# Patient Record
Sex: FEMALE | Race: WHITE | Employment: STUDENT
[De-identification: names, ages, dates, MRNs, and addresses within clinical notes are randomized per-mention and may not be internally consistent; named-entity substitution may affect disease eponyms.]

---

## 2017-09-06 DIAGNOSIS — N92.6 IRREGULAR PERIODS: ICD-10-CM

## 2017-09-06 RX ORDER — ETONOGESTREL AND ETHINYL ESTRADIOL VAGINAL RING .015; .12 MG/D; MG/D
RING VAGINAL
Qty: 3 EACH | Refills: 0 | Status: SHIPPED | OUTPATIENT
Start: 2017-09-06 | End: 2017-11-29

## 2017-09-06 NOTE — TELEPHONE ENCOUNTER
8/8/16 last office visit  Future appointment currently not scheduled.    BP Readings from Last 2 Encounters:   08/18/16 93/61   08/09/15 98/70     Annual visit is due.  Will give brenda period x1 per refill policy protocol.  Patient to schedule appointment for additional refills.    Michelle Black   [facility]  RN

## 2017-11-29 DIAGNOSIS — N92.6 IRREGULAR PERIODS: ICD-10-CM

## 2017-11-29 RX ORDER — ETONOGESTREL AND ETHINYL ESTRADIOL VAGINAL RING .015; .12 MG/D; MG/D
RING VAGINAL
Qty: 3 EACH | Refills: 3 | Status: SHIPPED | OUTPATIENT
Start: 2017-11-29 | End: 2018-05-03

## 2017-11-29 NOTE — TELEPHONE ENCOUNTER
8/18/16 last office visit for annual.  Future appointment currently not scheduled.    Routing refill request to provider for review/approval because:  Jennifer given x1 and patient did not follow up, please advise    Thank you.    Michelle Black   [facility]  RN

## 2018-03-17 ENCOUNTER — HEALTH MAINTENANCE LETTER (OUTPATIENT)
Age: 23
End: 2018-03-17

## 2018-05-03 DIAGNOSIS — N92.6 IRREGULAR PERIODS: ICD-10-CM

## 2018-05-03 RX ORDER — ETONOGESTREL AND ETHINYL ESTRADIOL VAGINAL RING .015; .12 MG/D; MG/D
RING VAGINAL
Qty: 3 EACH | Refills: 3 | Status: SHIPPED | OUTPATIENT
Start: 2018-05-03

## 2018-05-03 NOTE — TELEPHONE ENCOUNTER
Last office visit 8/18/16  Last prescription written 11/29/17  Patient using continuously.    Outside of RN guidelines for refill.  Will send to provider for consideration.  Patient needs office visit for annual visit.    Michelle Black   [facility]  RN

## 2019-11-03 ENCOUNTER — HEALTH MAINTENANCE LETTER (OUTPATIENT)
Age: 24
End: 2019-11-03

## 2020-11-16 ENCOUNTER — HEALTH MAINTENANCE LETTER (OUTPATIENT)
Age: 25
End: 2020-11-16

## 2021-09-18 ENCOUNTER — HEALTH MAINTENANCE LETTER (OUTPATIENT)
Age: 26
End: 2021-09-18

## 2021-12-21 ENCOUNTER — OFFICE VISIT (OUTPATIENT)
Dept: FAMILY MEDICINE | Facility: CLINIC | Age: 26
End: 2021-12-21
Payer: COMMERCIAL

## 2021-12-21 VITALS
BODY MASS INDEX: 24.43 KG/M2 | SYSTOLIC BLOOD PRESSURE: 108 MMHG | WEIGHT: 139 LBS | HEART RATE: 72 BPM | OXYGEN SATURATION: 98 % | TEMPERATURE: 98 F | DIASTOLIC BLOOD PRESSURE: 60 MMHG | RESPIRATION RATE: 16 BRPM

## 2021-12-21 DIAGNOSIS — T16.1XXA FOREIGN BODY OF RIGHT EAR, INITIAL ENCOUNTER: ICD-10-CM

## 2021-12-21 DIAGNOSIS — H92.01: Primary | ICD-10-CM

## 2021-12-21 PROCEDURE — 99203 OFFICE O/P NEW LOW 30 MIN: CPT | Performed by: NURSE PRACTITIONER

## 2021-12-21 RX ORDER — OFLOXACIN 3 MG/ML
5 SOLUTION AURICULAR (OTIC) DAILY
Qty: 10 ML | Refills: 0 | Status: SHIPPED | OUTPATIENT
Start: 2021-12-21

## 2021-12-21 RX ORDER — COPPER 313.4 MG/1
1 INTRAUTERINE DEVICE INTRAUTERINE ONCE
COMMUNITY

## 2021-12-21 RX ORDER — TRETINOIN 0.25 MG/G
CREAM TOPICAL
COMMUNITY
Start: 2021-01-12

## 2021-12-21 RX ORDER — SPIRONOLACTONE 50 MG/1
TABLET, FILM COATED ORAL
COMMUNITY
Start: 2021-01-12

## 2021-12-21 RX ORDER — CLINDAMYCIN PHOSPHATE 10 MG/G
GEL TOPICAL
COMMUNITY
Start: 2021-01-12

## 2021-12-21 NOTE — PROGRESS NOTES
Assessment & Plan     Earache symptoms, right  ? Otitis externa - inflammation/irritation due to foreign body - now self removed prior to clinic visit.  Exam clear.    - Otolaryngology Referral  - ofloxacin (FLOXIN) 0.3 % otic solution  Dispense: 10 mL; Refill: 0    Foreign body of right ear, initial encounter   Follow-up with ENT if symptoms not improving despite treatment.    - Otolaryngology Referral  - ofloxacin (FLOXIN) 0.3 % otic solution  Dispense: 10 mL; Refill: 0         Patient Instructions   Even though nothing was removed from the ear today - irritation was seen in the canal   Would treat with Ofloxacin ear drops and follow up with ENT if ear symptoms not improving for repeat inspection of the ear drum/canal.    NISHANT Silva    Patient Education     Foreign Body in the Ear Canal (Removed)    An object has been removed from the ear canal. A foreign body in the ear can lead to irritation. Sometimes this can cause infection in the outer ear canal.   Home care    If prescription ear drops have been given, use these as directed. Don't get water in your ear for the next 5 days. (Don't go swimming for 5 days.)    You may use acetaminophen, naproxen, or ibuprofen to control pain, unless another pain medicine was prescribed. Talk with your healthcare provider before taking these medicines if you have chronic liver or kidney disease, or if you have ever had a stomach ulcer or digestive bleeding.    Follow-up care  Follow up with your healthcare provider, or as advised.  When to seek medical advice  Call your healthcare provider right away if any of these occur    Ear pain, itching, or discharge    Redness or swelling of the outer ear    Blood or fluid draining from the ear    Persistent hearing loss    Fever of 100.4 F (38 C) or higher , or as directed by your healthcare provider  Candy last reviewed this educational content on 8/1/2019 2000-2021 [employer]. All rights reserved. This  information is not intended as a substitute for professional medical care. Always follow your healthcare professional's instructions.               Return if symptoms worsen or fail to improve, for Recheck symptoms.    Neida Shepherd NP  [facility] KINGA Raman is a 26 year old who presents for the following health issues  accompanied by her self.    HPI     Concern - ear ring piece in right ear  Onset: 12/20/2021  Description:ear ring front in ear canal  Intensity: none  Progression of Symptoms:  none  Accompanying Signs & Symptoms: none  Previous history of similar problem: none  Precipitating factors:        Worsened by: none  Alleviating factors:        Improved by: none  Therapies tried and outcome: None    No bleeding or drainage from ear.  No hearing loss.    Reports has tragal piercing and lost back of earring in right ear drum.    No recent illness or concerns.      Review of Systems   Constitutional, HEENT, cardiovascular, pulmonary, GI, , musculoskeletal, neuro, skin, endocrine and psych systems are negative, except as otherwise noted.      Objective    /60 (BP Location: Right arm, Patient Position: Chair, Cuff Size: Adult Regular)   Pulse 72   Temp 98  F (36.7  C) (Tympanic)   Resp 16   Wt 63 kg (139 lb)   LMP 12/11/2021   SpO2 98%   BMI 24.43 kg/m    Body mass index is 24.43 kg/m .  Physical Exam   GENERAL: healthy, alert and no distress  HENT: normal cephalic/atraumatic, ear canal right with mild erythema and tenderness on inspection and TM's normal, nose and mouth without ulcers or lesions, oropharynx clear and oral mucous membranes moist    Inspect right ear with lighted tool and no foreign object seen.  Gentle irrigation performed X 2 without any object removed from right ear.  Clear irrigation.  Tolerated well without pain.

## 2021-12-21 NOTE — PATIENT INSTRUCTIONS
Even though nothing was removed from the ear today - irritation was seen in the canal   Would treat with Ofloxacin ear drops and follow up with ENT if ear symptoms not improving for repeat inspection of the ear drum/canal.    NISHANT Silva    Patient Education     Foreign Body in the Ear Canal (Removed)    An object has been removed from the ear canal. A foreign body in the ear can lead to irritation. Sometimes this can cause infection in the outer ear canal.   Home care    If prescription ear drops have been given, use these as directed. Don't get water in your ear for the next 5 days. (Don't go swimming for 5 days.)    You may use acetaminophen, naproxen, or ibuprofen to control pain, unless another pain medicine was prescribed. Talk with your healthcare provider before taking these medicines if you have chronic liver or kidney disease, or if you have ever had a stomach ulcer or digestive bleeding.    Follow-up care  Follow up with your healthcare provider, or as advised.  When to seek medical advice  Call your healthcare provider right away if any of these occur    Ear pain, itching, or discharge    Redness or swelling of the outer ear    Blood or fluid draining from the ear    Persistent hearing loss    Fever of 100.4 F (38 C) or higher , or as directed by your healthcare provider  Candy last reviewed this educational content on 8/1/2019 2000-2021 [employer]. All rights reserved. This information is not intended as a substitute for professional medical care. Always follow your healthcare professional's instructions.

## 2022-01-08 ENCOUNTER — HEALTH MAINTENANCE LETTER (OUTPATIENT)
Age: 27
End: 2022-01-08

## 2022-07-25 ENCOUNTER — TELEPHONE (OUTPATIENT)
Dept: FAMILY MEDICINE | Facility: CLINIC | Age: 27
End: 2022-07-25

## 2022-07-25 NOTE — TELEPHONE ENCOUNTER
Patient Quality Outreach    Patient is due for the following:   Cervical Cancer Screening - PAP Needed  Physical  - DUE NOW    NEXT STEPS:   Schedule a yearly physical    Type of outreach:    Sent mycuj[employer] message.      Questions for provider review:    None     EVERETT Mclaughlin LPN

## 2022-11-19 ENCOUNTER — HEALTH MAINTENANCE LETTER (OUTPATIENT)
Age: 27
End: 2022-11-19

## 2023-04-09 ENCOUNTER — HEALTH MAINTENANCE LETTER (OUTPATIENT)
Age: 28
End: 2023-04-09

## 2024-06-16 ENCOUNTER — HEALTH MAINTENANCE LETTER (OUTPATIENT)
Age: 29
End: 2024-06-16